# Patient Record
Sex: MALE | Race: WHITE
[De-identification: names, ages, dates, MRNs, and addresses within clinical notes are randomized per-mention and may not be internally consistent; named-entity substitution may affect disease eponyms.]

---

## 2021-02-19 ENCOUNTER — HOSPITAL ENCOUNTER (INPATIENT)
Dept: HOSPITAL 94 - ER | Age: 66
LOS: 2 days | Discharge: HOME | DRG: 670 | End: 2021-02-21
Attending: INTERNAL MEDICINE | Admitting: INTERNAL MEDICINE
Payer: MEDICARE

## 2021-02-19 VITALS — DIASTOLIC BLOOD PRESSURE: 69 MMHG | SYSTOLIC BLOOD PRESSURE: 117 MMHG

## 2021-02-19 VITALS — DIASTOLIC BLOOD PRESSURE: 61 MMHG | SYSTOLIC BLOOD PRESSURE: 120 MMHG

## 2021-02-19 VITALS — HEIGHT: 72 IN | WEIGHT: 200.42 LBS | BODY MASS INDEX: 27.15 KG/M2

## 2021-02-19 DIAGNOSIS — E78.5: ICD-10-CM

## 2021-02-19 DIAGNOSIS — Z66: ICD-10-CM

## 2021-02-19 DIAGNOSIS — E78.00: ICD-10-CM

## 2021-02-19 DIAGNOSIS — I10: ICD-10-CM

## 2021-02-19 DIAGNOSIS — Z91.19: ICD-10-CM

## 2021-02-19 DIAGNOSIS — F17.210: ICD-10-CM

## 2021-02-19 DIAGNOSIS — D49.4: Primary | ICD-10-CM

## 2021-02-19 DIAGNOSIS — Z86.74: ICD-10-CM

## 2021-02-19 DIAGNOSIS — N32.89: ICD-10-CM

## 2021-02-19 DIAGNOSIS — Z95.1: ICD-10-CM

## 2021-02-19 DIAGNOSIS — I35.0: ICD-10-CM

## 2021-02-19 DIAGNOSIS — R33.9: ICD-10-CM

## 2021-02-19 DIAGNOSIS — I86.2: ICD-10-CM

## 2021-02-19 DIAGNOSIS — G89.29: ICD-10-CM

## 2021-02-19 DIAGNOSIS — Z87.442: ICD-10-CM

## 2021-02-19 DIAGNOSIS — I25.10: ICD-10-CM

## 2021-02-19 DIAGNOSIS — I25.82: ICD-10-CM

## 2021-02-19 DIAGNOSIS — Z88.8: ICD-10-CM

## 2021-02-19 DIAGNOSIS — Z82.5: ICD-10-CM

## 2021-02-19 DIAGNOSIS — Z71.6: ICD-10-CM

## 2021-02-19 DIAGNOSIS — N20.0: ICD-10-CM

## 2021-02-19 LAB
ALBUMIN SERPL BCP-MCNC: 3.9 G/DL (ref 3.4–5)
ALBUMIN/GLOB SERPL: 1.1 {RATIO} (ref 1.1–1.5)
ALP SERPL-CCNC: 63 IU/L (ref 46–116)
ALT SERPL W P-5'-P-CCNC: 26 U/L (ref 12–78)
ANION GAP SERPL CALCULATED.3IONS-SCNC: 11 MMOL/L (ref 8–16)
AST SERPL W P-5'-P-CCNC: 58 U/L (ref 10–37)
BACTERIA URNS QL MICRO: (no result) /HPF
BASOPHILS # BLD AUTO: 0.1 X10'3 (ref 0–0.2)
BASOPHILS # BLD AUTO: 0.1 X10'3 (ref 0–0.2)
BASOPHILS NFR BLD AUTO: 0.8 % (ref 0–1)
BASOPHILS NFR BLD AUTO: 1 % (ref 0–1)
BILIRUB SERPL-MCNC: 0.9 MG/DL (ref 0.1–1)
BUN SERPL-MCNC: 20 MG/DL (ref 7–18)
BUN/CREAT SERPL: 13.7 (ref 5.4–32)
CALCIUM SERPL-MCNC: 9.3 MG/DL (ref 8.5–10.1)
CHLORIDE SERPL-SCNC: 103 MMOL/L (ref 99–107)
CLARITY UR: (no result)
CO2 SERPL-SCNC: 23.6 MMOL/L (ref 24–32)
COLOR UR: (no result)
CREAT SERPL-MCNC: 1.46 MG/DL (ref 0.6–1.1)
DEPRECATED SQUAMOUS URNS QL MICRO: (no result) /LPF
EOSINOPHIL # BLD AUTO: 0.1 X10'3 (ref 0–0.9)
EOSINOPHIL # BLD AUTO: 0.2 X10'3 (ref 0–0.9)
EOSINOPHIL NFR BLD AUTO: 0.7 % (ref 0–6)
EOSINOPHIL NFR BLD AUTO: 1.6 % (ref 0–6)
ERYTHROCYTE [DISTWIDTH] IN BLOOD BY AUTOMATED COUNT: 13.7 % (ref 11.5–14.5)
ERYTHROCYTE [DISTWIDTH] IN BLOOD BY AUTOMATED COUNT: 13.7 % (ref 11.5–14.5)
GFR SERPL CREATININE-BSD FRML MDRD: 48 ML/MIN
GLUCOSE SERPL-MCNC: 106 MG/DL (ref 70–104)
GLUCOSE UR STRIP-MCNC: (no result) MG/DL
HCT VFR BLD AUTO: 38.1 % (ref 42–52)
HCT VFR BLD AUTO: 40.3 % (ref 42–52)
HGB BLD-MCNC: 12.9 G/DL (ref 14–17.9)
HGB BLD-MCNC: 13.8 G/DL (ref 14–17.9)
HGB UR QL STRIP: (no result)
HYALINE CASTS URNS QL MICRO: (no result) /LPF
KETONES UR STRIP-MCNC: (no result) MG/DL
LEUKOCYTE ESTERASE UR QL STRIP: (no result)
LYMPHOCYTES # BLD AUTO: 1.8 X10'3 (ref 1.1–4.8)
LYMPHOCYTES # BLD AUTO: 2.5 X10'3 (ref 1.1–4.8)
LYMPHOCYTES NFR BLD AUTO: 15.6 % (ref 21–51)
LYMPHOCYTES NFR BLD AUTO: 27.5 % (ref 21–51)
MCH RBC QN AUTO: 32.8 PG (ref 27–31)
MCH RBC QN AUTO: 33 PG (ref 27–31)
MCHC RBC AUTO-ENTMCNC: 34 G/DL (ref 33–36.5)
MCHC RBC AUTO-ENTMCNC: 34.3 G/DL (ref 33–36.5)
MCV RBC AUTO: 96.3 FL (ref 78–98)
MCV RBC AUTO: 96.6 FL (ref 78–98)
MONOCYTES # BLD AUTO: 0.8 X10'3 (ref 0–0.9)
MONOCYTES # BLD AUTO: 0.9 X10'3 (ref 0–0.9)
MONOCYTES NFR BLD AUTO: 7.7 % (ref 2–12)
MONOCYTES NFR BLD AUTO: 8.2 % (ref 2–12)
NEUTROPHILS # BLD AUTO: 5.7 X10'3 (ref 1.8–7.7)
NEUTROPHILS # BLD AUTO: 8.5 X10'3 (ref 1.8–7.7)
NEUTROPHILS NFR BLD AUTO: 61.7 % (ref 42–75)
NEUTROPHILS NFR BLD AUTO: 75.2 % (ref 42–75)
NITRITE UR QL STRIP: (no result)
PH UR STRIP: (no result) [PH] (ref 4.8–8)
PLATELET # BLD AUTO: 161 X10'3 (ref 140–440)
PLATELET # BLD AUTO: 165 X10'3 (ref 140–440)
PMV BLD AUTO: 9.1 FL (ref 7.4–10.4)
PMV BLD AUTO: 9.1 FL (ref 7.4–10.4)
POTASSIUM SERPL-SCNC: 4.3 MMOL/L (ref 3.5–5.1)
PROT SERPL-MCNC: 7.3 G/DL (ref 6.4–8.2)
PROT UR QL STRIP: (no result) MG/DL
RBC # BLD AUTO: 3.94 X10'6 (ref 4.7–6.1)
RBC # BLD AUTO: 4.19 X10'6 (ref 4.7–6.1)
RBC #/AREA URNS HPF: (no result) /HPF (ref 0–2)
SODIUM SERPL-SCNC: 138 MMOL/L (ref 135–145)
SP GR UR STRIP: (no result) (ref 1–1.03)
URN COLLECT METHOD CLASS: (no result)
UROBILINOGEN UR STRIP-MCNC: (no result) E.U/DL (ref 0.2–1)
WBC # BLD AUTO: 11.3 X10'3 (ref 4.5–11)
WBC # BLD AUTO: 9.2 X10'3 (ref 4.5–11)
WBC CLUMPS #/AREA URNS HPF: (no result) /HPF

## 2021-02-19 PROCEDURE — 97530 THERAPEUTIC ACTIVITIES: CPT

## 2021-02-19 PROCEDURE — 93308 TTE F-UP OR LMTD: CPT

## 2021-02-19 PROCEDURE — 99285 EMERGENCY DEPT VISIT HI MDM: CPT

## 2021-02-19 PROCEDURE — 87081 CULTURE SCREEN ONLY: CPT

## 2021-02-19 PROCEDURE — 85025 COMPLETE CBC W/AUTO DIFF WBC: CPT

## 2021-02-19 PROCEDURE — 97161 PT EVAL LOW COMPLEX 20 MIN: CPT

## 2021-02-19 PROCEDURE — 97116 GAIT TRAINING THERAPY: CPT

## 2021-02-19 PROCEDURE — 83735 ASSAY OF MAGNESIUM: CPT

## 2021-02-19 PROCEDURE — 74176 CT ABD & PELVIS W/O CONTRAST: CPT

## 2021-02-19 PROCEDURE — 93306 TTE W/DOPPLER COMPLETE: CPT

## 2021-02-19 PROCEDURE — 80053 COMPREHEN METABOLIC PANEL: CPT

## 2021-02-19 PROCEDURE — 81001 URINALYSIS AUTO W/SCOPE: CPT

## 2021-02-19 PROCEDURE — 36415 COLL VENOUS BLD VENIPUNCTURE: CPT

## 2021-02-19 PROCEDURE — 87088 URINE BACTERIA CULTURE: CPT

## 2021-02-19 PROCEDURE — 87040 BLOOD CULTURE FOR BACTERIA: CPT

## 2021-02-19 RX ADMIN — SODIUM CHLORIDE SCH MLS/HR: 9 INJECTION INTRAMUSCULAR; INTRAVENOUS; SUBCUTANEOUS at 19:32

## 2021-02-19 RX ADMIN — METOPROLOL TARTRATE SCH MG: 25 TABLET, FILM COATED ORAL at 21:03

## 2021-02-19 RX ADMIN — CIPROFLOXACIN SCH MLS/HR: 2 INJECTION, SOLUTION INTRAVENOUS at 21:39

## 2021-02-19 NOTE — NUR
[address] Tru[place] placed with the help of a urojet and Ativan 1 mg 
IV.  Continuous irrigation flowing freely w/ pink tone urine.  Pt tolerated 
procedure well and is resting comfortably.  Pt wife states he has dentures 
upper and lower in his jacket pocket and has in his patient  belonging bag 
clothing, shoes, phone and .

## 2021-02-19 NOTE — NUR
DR. SENIOR AT BEDSIDE. PATIENT HAVING PAIN AND FEELS LIKE HE HAS TO PEE. LARGE 
CLOTS NOTED IN CATHETER. ORDERS TO CHANGE MASON FOR CONTINUOUS BLADDER 
IRRIGATION.

## 2021-02-19 NOTE — NUR
Adolfo HICKS updated. Patient/family removed valente bag prior to ER visit. The 
valente is open to air and is sitting inside a urinal. A few drops of bloody 
discharge noted in urinal, but unable to obtain sample due to lack of volume. 
Concerned that valente is clotted.

## 2021-02-19 NOTE — NUR
Received report from Adrianna CARMICHAEL. Patient came up to the floor via gurney and was able to 
transfer into bed with min assist. Bed placed in locked and low position. Call light placed 
within reach.

## 2021-02-19 NOTE — NUR
Trujillo catheter flushed as ordered. Small clots removed with flushing. Bright 
red colored urine still returned after flushes.

## 2021-02-20 VITALS — DIASTOLIC BLOOD PRESSURE: 65 MMHG | SYSTOLIC BLOOD PRESSURE: 122 MMHG

## 2021-02-20 VITALS — DIASTOLIC BLOOD PRESSURE: 61 MMHG | SYSTOLIC BLOOD PRESSURE: 126 MMHG

## 2021-02-20 VITALS — DIASTOLIC BLOOD PRESSURE: 69 MMHG | SYSTOLIC BLOOD PRESSURE: 102 MMHG

## 2021-02-20 VITALS — DIASTOLIC BLOOD PRESSURE: 70 MMHG | SYSTOLIC BLOOD PRESSURE: 135 MMHG

## 2021-02-20 VITALS — DIASTOLIC BLOOD PRESSURE: 54 MMHG | SYSTOLIC BLOOD PRESSURE: 91 MMHG

## 2021-02-20 VITALS — SYSTOLIC BLOOD PRESSURE: 127 MMHG | DIASTOLIC BLOOD PRESSURE: 64 MMHG

## 2021-02-20 VITALS — SYSTOLIC BLOOD PRESSURE: 134 MMHG | DIASTOLIC BLOOD PRESSURE: 68 MMHG

## 2021-02-20 VITALS — SYSTOLIC BLOOD PRESSURE: 118 MMHG | DIASTOLIC BLOOD PRESSURE: 66 MMHG

## 2021-02-20 VITALS — SYSTOLIC BLOOD PRESSURE: 127 MMHG | DIASTOLIC BLOOD PRESSURE: 67 MMHG

## 2021-02-20 VITALS — DIASTOLIC BLOOD PRESSURE: 56 MMHG | SYSTOLIC BLOOD PRESSURE: 134 MMHG

## 2021-02-20 VITALS — DIASTOLIC BLOOD PRESSURE: 50 MMHG | SYSTOLIC BLOOD PRESSURE: 126 MMHG

## 2021-02-20 VITALS — DIASTOLIC BLOOD PRESSURE: 54 MMHG | SYSTOLIC BLOOD PRESSURE: 127 MMHG

## 2021-02-20 VITALS — DIASTOLIC BLOOD PRESSURE: 66 MMHG | SYSTOLIC BLOOD PRESSURE: 138 MMHG

## 2021-02-20 VITALS — SYSTOLIC BLOOD PRESSURE: 102 MMHG | DIASTOLIC BLOOD PRESSURE: 69 MMHG

## 2021-02-20 VITALS — SYSTOLIC BLOOD PRESSURE: 134 MMHG | DIASTOLIC BLOOD PRESSURE: 66 MMHG

## 2021-02-20 VITALS — SYSTOLIC BLOOD PRESSURE: 98 MMHG | DIASTOLIC BLOOD PRESSURE: 50 MMHG

## 2021-02-20 VITALS — SYSTOLIC BLOOD PRESSURE: 131 MMHG | DIASTOLIC BLOOD PRESSURE: 67 MMHG

## 2021-02-20 LAB
ALBUMIN SERPL BCP-MCNC: 3.2 G/DL (ref 3.4–5)
ALBUMIN/GLOB SERPL: 1.1 {RATIO} (ref 1.1–1.5)
ALP SERPL-CCNC: 55 IU/L (ref 46–116)
ALT SERPL W P-5'-P-CCNC: 23 U/L (ref 12–78)
ANION GAP SERPL CALCULATED.3IONS-SCNC: 11 MMOL/L (ref 8–16)
AST SERPL W P-5'-P-CCNC: 53 U/L (ref 10–37)
BASOPHILS # BLD AUTO: 0 X10'3 (ref 0–0.2)
BASOPHILS NFR BLD AUTO: 0.5 % (ref 0–1)
BILIRUB SERPL-MCNC: 0.7 MG/DL (ref 0.1–1)
BUN SERPL-MCNC: 19 MG/DL (ref 7–18)
BUN/CREAT SERPL: 15.2 (ref 5.4–32)
CALCIUM SERPL-MCNC: 8.3 MG/DL (ref 8.5–10.1)
CHLORIDE SERPL-SCNC: 106 MMOL/L (ref 99–107)
CO2 SERPL-SCNC: 22.7 MMOL/L (ref 24–32)
CREAT SERPL-MCNC: 1.25 MG/DL (ref 0.6–1.1)
EOSINOPHIL # BLD AUTO: 0.2 X10'3 (ref 0–0.9)
EOSINOPHIL NFR BLD AUTO: 2.5 % (ref 0–6)
ERYTHROCYTE [DISTWIDTH] IN BLOOD BY AUTOMATED COUNT: 13.6 % (ref 11.5–14.5)
GFR SERPL CREATININE-BSD FRML MDRD: 57 ML/MIN
GLUCOSE SERPL-MCNC: 119 MG/DL (ref 70–104)
HCT VFR BLD AUTO: 33.5 % (ref 42–52)
HGB BLD-MCNC: 11.8 G/DL (ref 14–17.9)
LYMPHOCYTES # BLD AUTO: 2.9 X10'3 (ref 1.1–4.8)
LYMPHOCYTES NFR BLD AUTO: 35.5 % (ref 21–51)
MAGNESIUM SERPL-MCNC: 2 MG/DL (ref 1.5–2.4)
MCH RBC QN AUTO: 33.7 PG (ref 27–31)
MCHC RBC AUTO-ENTMCNC: 35.4 G/DL (ref 33–36.5)
MCV RBC AUTO: 95.2 FL (ref 78–98)
MONOCYTES # BLD AUTO: 0.7 X10'3 (ref 0–0.9)
MONOCYTES NFR BLD AUTO: 8.5 % (ref 2–12)
NEUTROPHILS # BLD AUTO: 4.3 X10'3 (ref 1.8–7.7)
NEUTROPHILS NFR BLD AUTO: 53 % (ref 42–75)
PLATELET # BLD AUTO: 149 X10'3 (ref 140–440)
PMV BLD AUTO: 9.2 FL (ref 7.4–10.4)
POTASSIUM SERPL-SCNC: 4.3 MMOL/L (ref 3.5–5.1)
PROT SERPL-MCNC: 6.2 G/DL (ref 6.4–8.2)
RBC # BLD AUTO: 3.52 X10'6 (ref 4.7–6.1)
SODIUM SERPL-SCNC: 140 MMOL/L (ref 135–145)
WBC # BLD AUTO: 8.2 X10'3 (ref 4.5–11)

## 2021-02-20 PROCEDURE — 0TBB8ZZ EXCISION OF BLADDER, VIA NATURAL OR ARTIFICIAL OPENING ENDOSCOPIC: ICD-10-PCS | Performed by: UROLOGY

## 2021-02-20 PROCEDURE — 0TCB8ZZ EXTIRPATION OF MATTER FROM BLADDER, VIA NATURAL OR ARTIFICIAL OPENING ENDOSCOPIC: ICD-10-PCS | Performed by: UROLOGY

## 2021-02-20 RX ADMIN — SODIUM CHLORIDE SCH MLS/HR: 9 INJECTION INTRAMUSCULAR; INTRAVENOUS; SUBCUTANEOUS at 12:47

## 2021-02-20 RX ADMIN — CIPROFLOXACIN SCH MLS/HR: 2 INJECTION, SOLUTION INTRAVENOUS at 08:14

## 2021-02-20 RX ADMIN — METOPROLOL TARTRATE SCH MG: 25 TABLET, FILM COATED ORAL at 20:42

## 2021-02-20 RX ADMIN — CIPROFLOXACIN SCH MLS/HR: 2 INJECTION, SOLUTION INTRAVENOUS at 20:42

## 2021-02-20 RX ADMIN — SODIUM CHLORIDE SCH MLS/HR: 9 INJECTION INTRAMUSCULAR; INTRAVENOUS; SUBCUTANEOUS at 20:00

## 2021-02-20 RX ADMIN — METOPROLOL TARTRATE SCH MG: 25 TABLET, FILM COATED ORAL at 08:00

## 2021-02-20 RX ADMIN — Medication SCH MMU: at 20:44

## 2021-02-20 RX ADMIN — SODIUM CHLORIDE SCH MLS/HR: 9 INJECTION INTRAMUSCULAR; INTRAVENOUS; SUBCUTANEOUS at 00:03

## 2021-02-20 NOTE — NUR
Problems reprioritized. Patient report given, questions answered & plan of care reviewed 
with Geno CARMICHAEL.

## 2021-02-20 NOTE — NUR
PACU DISCHARGE CRITERIA MET, REPORT GIVEN TO FLOOR.  DENIES PAIN OR DISCOMFORT, TRANSFERRED 
TO ROOM IN STABLE GOOD CONDITION. BLADDER IRRIGATION FLUID CLEAR, AMOUNT DOCUMENTED ON FLOW 
SHEET

## 2021-02-20 NOTE — NUR
PAGER ID:  [phone] 

MESSAGE:  [id number] DINO CAN WE HAVE AN ORDER FOR SOMETHING FOR BLADDER SPASMS. TEAGAN [facility] 
[id number]

## 2021-02-20 NOTE — NUR
Recieved report from Pacu nurse Martina CARMICHAEL. Patient tolerated procedure well, NC 2.5L sats 
95-98%, VSS, INT in place, still on CBI, urine is pink.

## 2021-02-20 NOTE — NUR
Patient on continuos bladder irrigation. 11,250mls was instilled and 11,830mls was removed. 
Urine color is didi colored at times but patient will spasm up and try to urinate then urine 
will turn ashley red blood colored. Pain medication given to help with spasms.

## 2021-02-20 NOTE — NUR
Patient in room PCU 3023. I have received report from Marianna CARMICHAEL and had the opportunity to 
ask questions and assume patient care.

## 2021-02-20 NOTE — NUR
Problems reprioritized. Patient report given, questions answered & plan of care reviewed 
with Mason RN.

## 2021-02-20 NOTE — NUR
Dr. Phillips at [facility] to see patient, possible surgery this evening or in the morning, 
needs cardiology clearance.

## 2021-02-20 NOTE — NUR
At bedside with patient, with frequent changes of CBI. Urine dark red with multiple clots. 
Patient comfortable at the moment, but complains of pain frequently from clots in urine that 
he is unable to pass, without manual irrigation. Possible surgery this afternoon. Bed locked 
in lowest position, call light within reach, patient instructed to call for assistance, pt 
verbalized understanding.

## 2021-02-21 VITALS — DIASTOLIC BLOOD PRESSURE: 54 MMHG | SYSTOLIC BLOOD PRESSURE: 96 MMHG

## 2021-02-21 VITALS — DIASTOLIC BLOOD PRESSURE: 39 MMHG | SYSTOLIC BLOOD PRESSURE: 94 MMHG

## 2021-02-21 VITALS — DIASTOLIC BLOOD PRESSURE: 69 MMHG | SYSTOLIC BLOOD PRESSURE: 122 MMHG

## 2021-02-21 RX ADMIN — Medication SCH MMU: at 08:20

## 2021-02-21 RX ADMIN — CIPROFLOXACIN SCH MLS/HR: 2 INJECTION, SOLUTION INTRAVENOUS at 08:19

## 2021-02-21 RX ADMIN — METOPROLOL TARTRATE SCH MG: 25 TABLET, FILM COATED ORAL at 08:21

## 2021-02-21 NOTE — NUR
Problems reprioritized. Patient report given, questions answered & plan of care reviewed 
with PAMELLA CARMICHAEL.

## 2021-02-21 NOTE — NUR
Patient discharged home with spouse. INT removed. Discharge instructions provided to patient 
and wife, including valente care, bag change, washing hands prior to change of bag, empty 
contents, and how to measure. Both verbalized understanding.

## 2021-02-21 NOTE — NUR
Patient in room PCU 3023. I have received report from Mason RN   and had the opportunity to 
ask questions and assume patient care.

## 2021-02-22 NOTE — NUR
CASE MANAGEMENT DISCHARGE FOLLOW UP: T/c to pt's provided number, no answer, voicemail full. 
T/c to pt's wife, straight to voicemail, left message requesting call back.

-------------------------------------------------------------------------------

Addendum: 02/22/21 at 1354 by Henrietta Dave RN

-------------------------------------------------------------------------------

1340 Received return call from pt's wife, spoke with both patient and his wife. Reports that 
he is "doing fine," has a little bit of pain but that it is not bad; denies CP, SOB/dyspnea, 
dizziness/weakness, N/V, sweating, difficulty voiding. Pt's wife states that his urine is 
dark in color, like old blood, she states cristopher is a good description for the color, states 
urine is clear. Advised that pt should increase his fluid intake, she verbalizes 
understanding. Verbalizes understanding of s/sx requiring further evaluation/emergent 
assistance. Verbalizes understanding of new and current medications. Verbalizes compliance 
with MD discharge instructions. Verbalizes understanding of the importance in making/keeping 
follow-up appointments, waiting to hear back from Dr Phillips's office. States no further 
questions/concerns at this time.

## 2022-04-06 ENCOUNTER — HOSPITAL ENCOUNTER (EMERGENCY)
Dept: HOSPITAL 94 - ER | Age: 67
Discharge: HOME | End: 2022-04-06
Payer: COMMERCIAL

## 2022-04-06 VITALS — DIASTOLIC BLOOD PRESSURE: 58 MMHG | SYSTOLIC BLOOD PRESSURE: 101 MMHG

## 2022-04-06 VITALS — HEIGHT: 72 IN | WEIGHT: 198.42 LBS | BODY MASS INDEX: 26.87 KG/M2
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
## 2022-04-06 DIAGNOSIS — G89.29: ICD-10-CM

## 2022-04-06 DIAGNOSIS — I10: ICD-10-CM

## 2022-04-06 DIAGNOSIS — F17.200: ICD-10-CM
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
## 2022-04-06 DIAGNOSIS — Z79.82: ICD-10-CM
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
## 2022-04-06 DIAGNOSIS — Z79.899: ICD-10-CM

## 2022-04-06 DIAGNOSIS — Z87.442: ICD-10-CM

## 2022-04-06 DIAGNOSIS — I25.10: ICD-10-CM

## 2022-04-06 DIAGNOSIS — E78.00: ICD-10-CM

## 2022-04-06 DIAGNOSIS — Z98.890: ICD-10-CM

## 2022-04-06 DIAGNOSIS — R51.9: Primary | ICD-10-CM

## 2022-04-06 PROCEDURE — 70450 CT HEAD/BRAIN W/O DYE: CPT

## 2022-04-06 PROCEDURE — 72125 CT NECK SPINE W/O DYE: CPT

## 2022-04-06 PROCEDURE — 99284 EMERGENCY DEPT VISIT MOD MDM: CPT
